# Patient Record
Sex: MALE | Race: WHITE | NOT HISPANIC OR LATINO | URBAN - METROPOLITAN AREA
[De-identification: names, ages, dates, MRNs, and addresses within clinical notes are randomized per-mention and may not be internally consistent; named-entity substitution may affect disease eponyms.]

---

## 2018-09-18 ENCOUNTER — OFFICE VISIT (OUTPATIENT)
Dept: URGENT CARE | Facility: CLINIC | Age: 53
End: 2018-09-18

## 2018-09-18 VITALS
HEIGHT: 70 IN | RESPIRATION RATE: 16 BRPM | OXYGEN SATURATION: 96 % | TEMPERATURE: 98.4 F | BODY MASS INDEX: 34.22 KG/M2 | DIASTOLIC BLOOD PRESSURE: 89 MMHG | WEIGHT: 239 LBS | SYSTOLIC BLOOD PRESSURE: 148 MMHG | HEART RATE: 100 BPM

## 2018-09-18 DIAGNOSIS — M25.561 RIGHT KNEE PAIN, UNSPECIFIED CHRONICITY: Primary | ICD-10-CM

## 2018-09-18 PROCEDURE — G0382 LEV 3 HOSP TYPE B ED VISIT: HCPCS | Performed by: PHYSICIAN ASSISTANT

## 2018-09-18 NOTE — PROGRESS NOTES
NAME: Consuelo Wilson is a 47 y o  male  : 1965    MRN: [id number]      Assessment and Plan   Right knee pain, unspecified chronicity [M25 [id number]]  1  Right knee pain, unspecified chronicity  XR knee 3 vw right non injury       X-ray right knee: no acute fracture visualized  Patient Instructions   Patient Instructions   Take naproxen for pain   Apply ice/ heat to the brittney 3-4 x daily  F/u with a PCP or ortho if no improvement in 5-7 days  Proceed to ER if symptoms worsen  History of Present Illness     Patient presents complaining of right hand pain x 2 days and right knee pain x 3 weeks  He reports  morning he work up and was having pain at the base of his thumb/ web space and denies any injury or inciting event  He reports no hx of right hand injury or pain  He reports the pain is worst when grabbing things or using his right hand  He reports he has taken an ibuprofen "here and there" but reports he is unsure as he had a kidney removed 2 years ago  Patient denies any numbness or tingling to the finger tips  He also complains of right knee pain and also denies any injury or inciting event  He reports he noticed it 3 weeks ago and states it is the worst when squatting, walking or standing long periods of time  He states he tried putting heat on it once which helped with the pain but has not done it again since  Denies any numbness or tingling to the foot  He reports hx of a meniscal tear which required surgery but states he is unsure of which knee that was  Review of Systems   Review of Systems   Constitutional: Negative for chills and fever  Musculoskeletal:        Right hand pain, right knee pain         Current Medications     No current outpatient prescriptions on file  Current Allergies     Allergies as of 2018    (No Known Allergies)              No past medical history on file  No past surgical history on file  No family history on file        Medications have been verified  The following portions of the patient's history were reviewed and updated as appropriate: allergies, current medications, past family history, past medical history, past social history, past surgical history and problem list     Objective   /89   Pulse 100   Temp 98 4 °F (36 9 °C)   Resp 16   Ht 5' 10" (1 778 m)   Wt 108 kg (239 lb)   SpO2 96%   BMI 34 29 kg/m²      Physical Exam     Physical Exam   Constitutional: He appears well-developed and well-nourished  No distress  Musculoskeletal:   Right hand: without erythema, edema, ecchymosis or abrasion  TTP over the 1st MCP joint and overlying tendons  NTTP at the rest of the MCP joints or anywhere else in the hand or wrist  Full AROM of thumb and wrist with most pain on full thumb flexion at MCP joint  Full 5/5  strength with some mild pain  + finkelstein  NSI    Right knee: without erythema, edema, ecchymosis or abrasion  NTTP anywhere in the knee  Full flexion and extension with reported pain on full extension  Negative valgus and varus  Negative anterior or posterior drawer   Crepitus with viviana but no pain on exam  NSI

## 2018-09-18 NOTE — PATIENT INSTRUCTIONS
Take naproxen for pain   Apply ice/ heat to the brittney 3-4 x daily  F/u with a PCP or ortho if no improvement in 5-7 days